# Patient Record
Sex: FEMALE | Race: WHITE | URBAN - METROPOLITAN AREA
[De-identification: names, ages, dates, MRNs, and addresses within clinical notes are randomized per-mention and may not be internally consistent; named-entity substitution may affect disease eponyms.]

---

## 2019-06-04 ENCOUNTER — IMPORTED ENCOUNTER (OUTPATIENT)
Dept: URBAN - METROPOLITAN AREA CLINIC 50 | Facility: CLINIC | Age: 69
End: 2019-06-04

## 2021-02-19 ENCOUNTER — IMPORTED ENCOUNTER (OUTPATIENT)
Dept: URBAN - METROPOLITAN AREA CLINIC 50 | Facility: CLINIC | Age: 71
End: 2021-02-19

## 2021-04-17 ASSESSMENT — TONOMETRY
OS_IOP_MMHG: 17
OD_IOP_MMHG: 16
OD_IOP_MMHG: 16
OS_IOP_MMHG: 18

## 2021-04-17 ASSESSMENT — VISUAL ACUITY
OS_PH: @ 17 IN
OS_OTHER: 20/25. 20/400.
OS_OTHER: 20/25. 20/25.
OD_CC: 20/20
OS_BAT: 20/25
OD_CC: J1+@ 16 IN
OS_CC: J1+@ 16 IN
OD_CC: 20/25-2
OD_CC: J1+@ 17 IN
OD_PH: @ 17 IN
OS_BAT: 20/25
OS_CC: 20/25-2
OS_CC: 20/20
OS_CC: J1+@ 17 IN

## 2022-02-23 ENCOUNTER — PREPPED CHART (OUTPATIENT)
Dept: URBAN - METROPOLITAN AREA CLINIC 49 | Facility: CLINIC | Age: 72
End: 2022-02-23

## 2022-07-21 ENCOUNTER — NEW PATIENT (OUTPATIENT)
Dept: URBAN - METROPOLITAN AREA CLINIC 49 | Facility: CLINIC | Age: 72
End: 2022-07-21

## 2022-07-21 DIAGNOSIS — H35.371: ICD-10-CM

## 2022-07-21 DIAGNOSIS — H35.361: ICD-10-CM

## 2022-07-21 DIAGNOSIS — H16.223: ICD-10-CM

## 2022-07-21 DIAGNOSIS — Z01.01: ICD-10-CM

## 2022-07-21 DIAGNOSIS — H40.013: ICD-10-CM

## 2022-07-21 PROCEDURE — 76514 ECHO EXAM OF EYE THICKNESS: CPT

## 2022-07-21 PROCEDURE — 92134 CPTRZ OPH DX IMG PST SGM RTA: CPT

## 2022-07-21 PROCEDURE — 92004 COMPRE OPH EXAM NEW PT 1/>: CPT

## 2022-07-21 PROCEDURE — 92133 CPTRZD OPH DX IMG PST SGM ON: CPT

## 2022-07-21 ASSESSMENT — PACHYMETRY
OS_CT_UM: 540
OD_CT_UM: 541

## 2022-07-21 ASSESSMENT — VISUAL ACUITY
OD_SC: J1+@16"
OS_CC: 20/30
OS_SC: J2@16"
OD_CC: 20/20-2
OD_CC: J1@16"
OS_PH: 20/25
OD_SC: 20/400
OS_CC: J10@16"
OS_SC: 20/200

## 2022-07-21 ASSESSMENT — TONOMETRY
OD_IOP_MMHG: 14
OS_IOP_MMHG: 16
OS_IOP_MMHG: 16
OD_IOP_MMHG: 14

## 2022-07-21 NOTE — PATIENT DISCUSSION
Recommend PF artificial tears 4 times daily in both eyes for best vision and comfort. Brands such as Refresh, Thera Tears, and Systane are good options. start warm compresses BID OU.

## 2022-07-21 NOTE — PATIENT DISCUSSION
Patient was told by previous eye doctor she was a suspect for glaucoma OS. GLAUCOMA SUSPECT-C/D: The patient is a glaucoma suspect because of suspicious appearance of the optic disc(s). IOP today 14/16. OCT RNFL shows borderline thin OD and thin OS. OS slightly tilted. Patient belives she was nearsighted before cataract surgery. RTC in 4-6 months for HVF/repeat RNFL/IOP check.

## 2023-04-19 ENCOUNTER — FOLLOW UP (OUTPATIENT)
Dept: URBAN - METROPOLITAN AREA CLINIC 53 | Facility: CLINIC | Age: 73
End: 2023-04-19

## 2023-04-19 DIAGNOSIS — H40.013: ICD-10-CM

## 2023-04-19 PROCEDURE — 92012 INTRM OPH EXAM EST PATIENT: CPT

## 2023-04-19 PROCEDURE — 92133 CPTRZD OPH DX IMG PST SGM ON: CPT

## 2023-04-19 ASSESSMENT — VISUAL ACUITY
OS_PH: 20/20
OS_CC: 20/30+2
OD_CC: 20/25-1

## 2023-04-19 ASSESSMENT — TONOMETRY
OS_IOP_MMHG: 14
OD_IOP_MMHG: 15

## 2023-04-27 ENCOUNTER — DIAGNOSTICS ONLY (OUTPATIENT)
Dept: URBAN - METROPOLITAN AREA CLINIC 49 | Facility: CLINIC | Age: 73
End: 2023-04-27

## 2023-04-27 DIAGNOSIS — H40.013: ICD-10-CM

## 2023-04-27 PROCEDURE — 92133 CPTRZD OPH DX IMG PST SGM ON: CPT

## 2023-04-27 PROCEDURE — 92083 EXTENDED VISUAL FIELD XM: CPT

## 2023-07-19 ENCOUNTER — COMPREHENSIVE EXAM (OUTPATIENT)
Dept: URBAN - METROPOLITAN AREA CLINIC 53 | Facility: CLINIC | Age: 73
End: 2023-07-19

## 2023-07-19 DIAGNOSIS — H35.361: ICD-10-CM

## 2023-07-19 DIAGNOSIS — H16.223: ICD-10-CM

## 2023-07-19 DIAGNOSIS — Z01.01: ICD-10-CM

## 2023-07-19 DIAGNOSIS — H40.013: ICD-10-CM

## 2023-07-19 DIAGNOSIS — H52.4: ICD-10-CM

## 2023-07-19 DIAGNOSIS — H35.371: ICD-10-CM

## 2023-07-19 PROCEDURE — 92134 CPTRZ OPH DX IMG PST SGM RTA: CPT

## 2023-07-19 PROCEDURE — 92133 CPTRZD OPH DX IMG PST SGM ON: CPT

## 2023-07-19 PROCEDURE — 92015 DETERMINE REFRACTIVE STATE: CPT

## 2023-07-19 PROCEDURE — 92014 COMPRE OPH EXAM EST PT 1/>: CPT

## 2023-07-19 ASSESSMENT — KERATOMETRY
OD_AXISANGLE2_DEGREES: 173
OD_K2POWER_DIOPTERS: 42.50
OS_AXISANGLE_DEGREES: 141
OS_K2POWER_DIOPTERS: 42.75
OS_AXISANGLE2_DEGREES: 51
OD_K1POWER_DIOPTERS: 41.75
OS_K1POWER_DIOPTERS: 41.50
OD_AXISANGLE_DEGREES: 83

## 2023-07-19 ASSESSMENT — VISUAL ACUITY
OS_PH: 20/25-1
OD_PH: 20/30+1
OD_CC: 20/40-1
OS_CC: 20/40-1/+2
OU_CC: J1+ @ 15IN

## 2023-07-19 ASSESSMENT — TONOMETRY
OS_IOP_MMHG: 18
OD_IOP_MMHG: 18

## 2024-07-22 ENCOUNTER — COMPREHENSIVE EXAM (OUTPATIENT)
Dept: URBAN - METROPOLITAN AREA CLINIC 53 | Facility: CLINIC | Age: 74
End: 2024-07-22

## 2024-07-22 DIAGNOSIS — H35.371: ICD-10-CM

## 2024-07-22 DIAGNOSIS — H35.361: ICD-10-CM

## 2024-07-22 DIAGNOSIS — H01.00B: ICD-10-CM

## 2024-07-22 DIAGNOSIS — H40.013: ICD-10-CM

## 2024-07-22 DIAGNOSIS — H16.223: ICD-10-CM

## 2024-07-22 DIAGNOSIS — H52.4: ICD-10-CM

## 2024-07-22 DIAGNOSIS — Z01.01: ICD-10-CM

## 2024-07-22 PROCEDURE — 92015 DETERMINE REFRACTIVE STATE: CPT

## 2024-07-22 PROCEDURE — 92014 COMPRE OPH EXAM EST PT 1/>: CPT

## 2024-07-22 ASSESSMENT — KERATOMETRY
OS_AXISANGLE_DEGREES: 141
OD_K2POWER_DIOPTERS: 42.50
OS_AXISANGLE2_DEGREES: 51
OS_K1POWER_DIOPTERS: 41.50
OD_K1POWER_DIOPTERS: 41.75
OD_AXISANGLE_DEGREES: 83
OS_K2POWER_DIOPTERS: 42.75
OD_AXISANGLE2_DEGREES: 173

## 2024-07-22 ASSESSMENT — TONOMETRY
OD_IOP_MMHG: 18
OS_IOP_MMHG: 17

## 2024-07-22 ASSESSMENT — VISUAL ACUITY
OD_CC: 20/25
OS_CC: 20/25
OU_CC: J1+2

## 2025-01-21 ENCOUNTER — DIAGNOSTICS ONLY (OUTPATIENT)
Age: 75
End: 2025-01-21

## 2025-01-21 DIAGNOSIS — H40.013: ICD-10-CM

## 2025-01-21 PROCEDURE — 92083 EXTENDED VISUAL FIELD XM: CPT

## 2025-01-21 PROCEDURE — 92133 CPTRZD OPH DX IMG PST SGM ON: CPT

## 2025-05-08 ENCOUNTER — APPOINTMENT (OUTPATIENT)
Dept: URBAN - METROPOLITAN AREA CLINIC 56 | Facility: CLINIC | Age: 75
Setting detail: DERMATOLOGY
End: 2025-05-08

## 2025-05-08 DIAGNOSIS — L82.1 OTHER SEBORRHEIC KERATOSIS: ICD-10-CM

## 2025-05-08 DIAGNOSIS — L57.0 ACTINIC KERATOSIS: ICD-10-CM

## 2025-05-08 PROCEDURE — ? FULL BODY SKIN EXAM - DECLINED

## 2025-05-08 PROCEDURE — ? LIQUID NITROGEN

## 2025-05-08 PROCEDURE — ? COUNSELING

## 2025-05-08 PROCEDURE — 99202 OFFICE O/P NEW SF 15 MIN: CPT | Mod: 25

## 2025-05-08 PROCEDURE — 17000 DESTRUCT PREMALG LESION: CPT

## 2025-05-08 ASSESSMENT — LOCATION SIMPLE DESCRIPTION DERM: LOCATION SIMPLE: RIGHT CHEEK

## 2025-05-08 ASSESSMENT — LOCATION ZONE DERM: LOCATION ZONE: FACE

## 2025-05-08 ASSESSMENT — LOCATION DETAILED DESCRIPTION DERM: LOCATION DETAILED: RIGHT SUPERIOR LATERAL MALAR CHEEK

## 2025-05-08 NOTE — HPI: SKIN LESION
How Severe Is Your Skin Lesion?: mild
Has Your Skin Lesion Been Treated?: not been treated
Is This A New Presentation, Or A Follow-Up?: Skin Lesion
6-12 yrs

## 2025-05-21 ENCOUNTER — APPOINTMENT (OUTPATIENT)
Dept: URBAN - METROPOLITAN AREA CLINIC 56 | Facility: CLINIC | Age: 75
Setting detail: DERMATOLOGY
End: 2025-05-21

## 2025-05-21 DIAGNOSIS — L82.1 OTHER SEBORRHEIC KERATOSIS: ICD-10-CM | Status: STABLE

## 2025-05-21 DIAGNOSIS — D22 MELANOCYTIC NEVI: ICD-10-CM | Status: STABLE

## 2025-05-21 DIAGNOSIS — L81.4 OTHER MELANIN HYPERPIGMENTATION: ICD-10-CM | Status: STABLE

## 2025-05-21 DIAGNOSIS — L57.0 ACTINIC KERATOSIS: ICD-10-CM

## 2025-05-21 DIAGNOSIS — D18.0 HEMANGIOMA: ICD-10-CM | Status: STABLE

## 2025-05-21 PROBLEM — D22.5 MELANOCYTIC NEVI OF TRUNK: Status: ACTIVE | Noted: 2025-05-21

## 2025-05-21 PROBLEM — D18.01 HEMANGIOMA OF SKIN AND SUBCUTANEOUS TISSUE: Status: ACTIVE | Noted: 2025-05-21

## 2025-05-21 PROBLEM — D48.5 NEOPLASM OF UNCERTAIN BEHAVIOR OF SKIN: Status: ACTIVE | Noted: 2025-05-21

## 2025-05-21 PROCEDURE — ? COUNSELING

## 2025-05-21 PROCEDURE — 11102 TANGNTL BX SKIN SINGLE LES: CPT

## 2025-05-21 PROCEDURE — ? LIQUID NITROGEN

## 2025-05-21 PROCEDURE — ? TREATMENT REGIMEN

## 2025-05-21 PROCEDURE — 99213 OFFICE O/P EST LOW 20 MIN: CPT | Mod: 25

## 2025-05-21 PROCEDURE — ? FULL BODY SKIN EXAM

## 2025-05-21 PROCEDURE — 17000 DESTRUCT PREMALG LESION: CPT | Mod: 59

## 2025-05-21 PROCEDURE — ? BIOPSY BY SHAVE METHOD

## 2025-05-21 ASSESSMENT — LOCATION DETAILED DESCRIPTION DERM
LOCATION DETAILED: SUPERIOR THORACIC SPINE
LOCATION DETAILED: LEFT NASAL SIDEWALL
LOCATION DETAILED: RIGHT SUPERIOR CENTRAL BUCCAL CHEEK
LOCATION DETAILED: INFERIOR THORACIC SPINE

## 2025-05-21 ASSESSMENT — LOCATION SIMPLE DESCRIPTION DERM
LOCATION SIMPLE: RIGHT CHEEK
LOCATION SIMPLE: LEFT NOSE
LOCATION SIMPLE: UPPER BACK

## 2025-05-21 ASSESSMENT — LOCATION ZONE DERM
LOCATION ZONE: TRUNK
LOCATION ZONE: FACE
LOCATION ZONE: NOSE